# Patient Record
(demographics unavailable — no encounter records)

---

## 2022-01-01 LAB
ABO + RH BLDCO: ABNORMAL
DAT IGG-SP REAG RBC-IMP: NEGATIVE
GLUCOSE BLD-MCNC: 41 MG/DL (ref 65–110)
GLUCOSE BLD-MCNC: 44 MG/DL (ref 65–110)
GLUCOSE BLD-MCNC: 44 MG/DL (ref 65–110)
GLUCOSE BLD-MCNC: 45 MG/DL (ref 65–110)
GLUCOSE BLD-MCNC: 45 MG/DL (ref 65–110)
GLUCOSE BLD-MCNC: 46 MG/DL (ref 65–110)
GLUCOSE BLD-MCNC: 48 MG/DL (ref 65–110)
GLUCOSE BLD-MCNC: 49 MG/DL (ref 65–110)
GLUCOSE BLD-MCNC: 53 MG/DL (ref 65–110)
GLUCOSE BLD-MCNC: 54 MG/DL (ref 65–110)
GLUCOSE BLD-MCNC: 57 MG/DL (ref 65–110)
LAB DEVICE SERIAL NUMBER: NORMAL
SHIPPED DATE: NORMAL
WEAK D: ABNORMAL
WEAK D: NORMAL

## 2022-01-01 NOTE — PROCEDURES
Circumcision Procedure        Patient:   Catrachita Maldonado            MRN: [id number]            FIN: [id number]               Age:   55 hours     Sex:  Male     :  2022   Associated Diagnoses:   Encounter for  circumcision   Author:   Shayne PADILLA      Procedure   Circumcision Note       Date: 2022       Time: 9:55 am       Preprocedure diagnosis: parent requests elective circumcision       Post-procedure diagnosis: status post circumcision       Procedure description: Patient place in supine position with upper body wrapped in blanket and legs gently restrained on circumcision board. 1% lidocaine injected at 1 oclock and 11 oclock positions at base of penis (0.5 cc each side SQ). Curved hemostats placed at 3 oclock and 9 oclock position to elevate the foreskin. Straight hemostat inserted between foreskin and head of penis to release adhesions 360 degrees. Straight hemostat clamped on dorsal surface for crimping; left in place for 1 minute. Scissors used to make the dorsal slit after straight hemostat removed. Pulled foreskin back to expose the penile head and check for any remaining adhesions. Gentle retraction with gauze to pull back any remaining adhesions to expose the penile corona 360 degrees. Foreskin pulled back up over penile head. Gomco bell inserted to cover penile head and secured with safety pin; bell pulled through Gomco base and secured in Gomco arm; straight hemostats used to pull remaining foreskin up to the end of the dorsal slit. Gomco arm tightened down for crimping x 5 minutes. 10 blade scalpel used to trim foreskin at the bell/base junction. Gomco arm unscrewed and foreskin gently released from bell. Inspected area for any bleeding-none found; vaseline and gauze placed over penile head. Patient tolerated procedure well; oral sucrose given intermittently throughout procedure for additional pain control. Consent signed: Yes       Specimen obtained:  Yes List specimen removed: penile foreskin       Anesthesia: **Dorsal penile nerve block with 1% lidocaine (without epinephrine) preservative-free; 1ml vial at bedside for physician use  **24% sucrose in purified water at bedside-give 1-2 drops by mouth prn pain       Gomco size:x1.3        Estimated blood loss: none       Complications: none       Comments: Patient tolerated procedure well  Vaseline and gauze applied  Parents updated on post surgical care      Impression and Plan   Diagnosis     Encounter for  circumcision ([id number] Z41.2, Discharge, Medical).      Signature Line     Electronically Signed on 2022 11:59 AM EDT   ________________________________________________   Mable Lombard D-NP

## 2022-01-01 NOTE — H&P
[facility] Admission H&P        Patient:   Cathy Palmer            MRN: [id number]            FIN: [id number]               Age:   4 hours     Sex:  Male     :  2022   Associated Diagnoses:   Liveborn infant by  delivery; Large for gestational age infant   Author:   Jenny PADILLA      Basic Information   Admitted from:  Labor and delivery. Present at bedside:  Family member, Mother, Father. Infant name is Ynes Hinojosa       Review of Systems   Not applicable:  Patient is . Health Status   Allergies: Allergic Reactions (Selected)  No Known Allergies,    Allergies (1) Active Reaction  No Known Allergies None Documented     Current medications: None,  (Selected)   Inpatient Medications  Ordered  Desitin 40% topical ointment: 1 odell, Topical, q4hr, PRN: other (see comment)  Sweet-Ease Oral syrup range dose: 4 mL, Oral, q10min, PRN: other (see comment)  glucose oral gel  (): 2.5 mL, Buccal, On Call, PRN: blood glucose  lidocaine 1% preservative-free injectable solution: 2 mL, ID, On Call,    Medications (4) Active  Scheduled: (1)  lidocaine 1% PF Inj Soln 2 mL  2 mL, ID, On Call  Continuous: (0)  PRN: (3)  glucose 40%  Oral Gel 2 mL  2.5 mL, Buccal, On Call  sucrose PF solution 15 ml  4 mL, Oral, q10min  zinc oxide 40% Topical Oint 30 gm  1 odell, Topical, q4hr     Problem list:    No qualifying data available        Histories      Maternal History   General information   The mother is  29 years old. : 3. Para: 2, abortions  1, living  2.  female. Prenatal labs   Blood type: A, Rh negative, Rhogam received during pregnancy, antibody screen is negative. Rapid plasma reagin: nonreactive. Hepatitis B: negative. Human immunodeficiency virus: negative. Group B Strep: positive. Rubella: immune. Current pregnancy   At delivery. Medications received during pregnancy   PNV  Vit C.      Past history   Medical  Heart Birth Weight Ounces Conversion 8.6 oz    Head Circumference 38 cm    Head Circumference 38 cm    Head Circumference 38 cm    Birth Head Circumference 38 cm    Birth Head Circumference 38 cm    Gestational Assessment Large for gestational age         Vital Signs (last 25 hrs)_____  Last Charted___________  Temp Axillary     37.0 degC  (AUG 01 16:40)  Heart Rate Apical    116 bpm  (AUG 01 16:40)  Resp Rate         40 br/min  (AUG 01 16:40)  Weight      4.325 kg  (AUG 01 13:09)  Height      54.5 cm  (AUG 01 13:09)     General:  No acute distress, In open crib. Eye:  Normal conjunctiva, Deferred due to erythromycin eye ointment. HENT:  Normocephalic, Anterior fontanelle open/soft/flat, Ears normally set and rotated, Palate intact. Neck:  Supple, Clavicles intact. Respiratory:  Lungs are clear to auscultation, Respirations are non-labored, Breath sounds are equal, Symmetrical chest wall expansion. Cardiovascular:  Normal rate, Regular rhythm, No murmur, Good pulses equal in all extremities, Normal peripheral perfusion. Gastrointestinal:  Soft, Normal bowel sounds, Cord clamped and drying. Genitourinary:  Normal genitalia for age and sex, R testicle in canal.    Musculoskeletal     Normal range of motion. No deformity. No hip clicks. Normal White's. Upper extremity exam: Upper extremity exam is within normal limits. Spine/torso exam: spine/torso exam is within normal limits. Lower extremity exam: Lower extremity exam is within normal limits, No hip clicks. Integumentary:  Warm, Dry, Pink. Neurologic:  Alert, Hand grasp present, Grossly intact. Review / Management   Results review:     No qualifying data available, All Results   2022 15:38 EDT Glucose POC 54.0 mg/dL  LOW   2022 14:03 EDT CRDABORH Interp A POS    Cord AR Interp Negative   .        Health Maintenance   Medication Administered:  Medication administered Hepatitis B vaccine, Phytonadione, erythromycin 0.5% ophthalmic ointment applied in both eyes, and 2022. Care Practices/ Screens   Hearing screen: prior to discharge. CCHD and [place] Screen PTD. TCB/TSB at 24 hours and PRN. .        Impression and Plan   Diagnosis     Liveborn infant by  delivery (FQT50-TR Z38.01, Discharge, Medical). Large for gestational age infant (OCL07-BD P80.4, Discharge, Medical). Condition:  Stable, This is a term LGA infant, glucoses thus far are stable, see above. Mother is breastfeeding, he has voided, yet to stool. EOS is 0.04, infant is well appearing. .    Plan     Circumcision: prior to discharge. Plan:     1) Follow infant vital signs, I/O and  screens   2) Assist with breastfeeding prn   3) Anticipate discharge with mom   4) Follow up will be with [facility]        5) Follow glucoses for LGA   Education and Follow-up:          Counseled: Family, I discussed infant exam, plan of care and when to follow with PCP. Rsoas Mcclain Discharge Planning: Plan to discharge ( In  2-3  days ), [facility] Within 1 to 2 days after discharge. Signature Line     Electronically Signed on 2022 05:55 PM EDT   ________________________________________________   Gold PADILLA      Electronically Signed on 2022 01:32 AM EDT   ________________________________________________   Allan Stevenson MD, Sasha Gardner.       Electronically Signed on 2022 08:19 AM EDT   ________________________________________________   Gold PADILLA            Modified by: Gold PADILLA on 2022 05:55 PM EDT      Modified by: Gold PADILLA on 2022 08:19 AM EDT

## 2022-01-01 NOTE — PROGRESS NOTES
[facility] Daily Note        Patient:   Cathy Palmer            MRN: [id number]            FIN: [id number]               Age:   23 hours     Sex:  Male     :  2022   Associated Diagnoses:   Liveborn infant by  delivery; Large for gestational age infant; Hypoglycemia,    Author:   Jenny PADILLA      Basic Information   Admitted from:  Labor and delivery. Present at bedside:  Family member, Mother, Father. Infant name is Ynes Hinojosa       Review of Systems   Not applicable:  Patient is . Health Status   Allergies: Allergic Reactions (Selected)  No Known Allergies,    Allergies (1) Active Reaction  No Known Allergies None Documented     Current medications: None,  (Selected)   Inpatient Medications  Ordered  Desitin 40% topical ointment: 1 odell, Topical, q4hr, PRN: other (see comment)  Sweet-Ease Oral syrup range dose: 4 mL, Oral, q10min, PRN: other (see comment)  glucose oral gel  (): 2.5 mL, Buccal, On Call, PRN: blood glucose  lidocaine 1% preservative-free injectable solution: 2 mL, ID, On Call,    Medications (4) Active  Scheduled: (1)  lidocaine 1% PF Inj Soln 2 mL  2 mL, ID, On Call  Continuous: (0)  PRN: (3)  glucose 40%  Oral Gel 2 mL  2.5 mL, Buccal, On Call  sucrose PF solution 15 ml  4 mL, Oral, q10min  zinc oxide 40% Topical Oint 30 gm  1 odell, Topical, q4hr     Problem list:    No qualifying data available        Histories      Maternal History   General information   The mother is  29 years old. : 3. Para: 2, abortions  1, living  2.  female. Prenatal labs   Blood type: A, Rh negative, Rhogam received during pregnancy, antibody screen is negative. Rapid plasma reagin: nonreactive. Hepatitis B: negative. Human immunodeficiency virus: negative. Group B Strep: positive. Rubella: immune. Current pregnancy   At delivery. Medications received during pregnancy   PNV  Vit C.      Past history 2022 13:09 EDT Height/Length Measured 54.5 cm    Height/Length Measured 54.5 cm    Height/Length Measured 54.5 cm    Birth Length 54.5 cm    Birth Length 54.5 cm    Weight Measured 4.325 kg    Weight Measured 4.325 kg    Weight Measured 4.325 kg    Weight Dosing 4.325 kg    Birth Weight 4.325 kg    Birth Weight 4.325 kg    Birth Weight Pounds Conversion 9 lb    Birth Weight Ounces Conversion 8.6 oz    Head Circumference 38 cm    Head Circumference 38 cm    Head Circumference 38 cm    Birth Head Circumference 38 cm    Birth Head Circumference 38 cm    Gestational Assessment Large for gestational age         Vital Signs (last 25 hrs)_____  Last Charted___________  Temp Axillary     37.3 degC  (AUG 02 04:33)  Heart Rate Apical    120 bpm  (AUG 02 04:33)  Resp Rate         48 br/min  (AUG 02 04:33)  Weight      4.325 kg  (AUG 01 13:09)  Height      54.5 cm  (AUG 01 13:09)     General:  No acute distress, In open crib. Eye:  Normal conjunctiva. HENT:  Normocephalic, Anterior fontanelle open/soft/flat, Ears normally set and rotated, Palate intact. Neck:  Supple, Clavicles intact. Respiratory:  Lungs are clear to auscultation, Respirations are non-labored, Breath sounds are equal, Symmetrical chest wall expansion. Cardiovascular:  Normal rate, Regular rhythm, No murmur, Good pulses equal in all extremities, Normal peripheral perfusion. Gastrointestinal:  Soft, Non-tender, Non-distended, Normal bowel sounds. Genitourinary:  Normal genitalia for age and sex, R testicle in canal.    Musculoskeletal     Normal range of motion. No deformity. No hip clicks. Normal White's. Upper extremity exam: Upper extremity exam is within normal limits. Spine/torso exam: spine/torso exam is within normal limits. Lower extremity exam: Lower extremity exam is within normal limits, No hip clicks. Integumentary:  Warm, Dry, Pink.     Neurologic:  Alert, Moves all extremities appropriately, [place], rooting, sucking reflexes are normal, Hand grasp present, Grossly intact. Review / Management   Results review:     No qualifying data available, All Results   2022 7:46 EDT Glucose POC 49.0 mg/dL  LOW   2022 6:26 EDT Glucose POC 41.0 mg/dL  LOW   2022 6:24 EDT Glucose POC 44.0 mg/dL  LOW   2022 0:38 EDT Glucose POC 53.0 mg/dL  LOW   2022 21:37 EDT Glucose POC 46.0 mg/dL  LOW   2022 17:56 EDT Glucose POC 48.0 mg/dL  LOW   2022 17:55 EDT Glucose POC 44.0 mg/dL  LOW   2022 15:38 EDT Glucose POC 54.0 mg/dL  LOW   2022 14:03 EDT CRDABORH Interp A POS    Cord AR Interp Negative   . Health Maintenance   Medication Administered:  Medication administered Hepatitis B vaccine, Phytonadione, erythromycin 0.5% ophthalmic ointment applied in both eyes, and 2022. Care Practices/ Screens   Hearing screen: prior to discharge. CCHD and  State Screen PTD. TCB/TSB at 24 hours and PRN. .        Impression and Plan   Diagnosis     Liveborn infant by  delivery (RTM17-BY Z38.01, Discharge, Medical). Large for gestational age infant (MQI73-GM P80.4, Discharge, Medical). Hypoglycemia,  (ZTB31-AA P70.4, Discharge, Medical). Condition:  Stable, This is a term LGA infant. Infant with hypoglycemia ( see glucoses above). Glucose gel x 1. Infant is breastfeeding fair, voiding and stooling appropriately. EOS is 0.04, infant is well appearing. .    Plan     Circumcision: prior to discharge. Plan:     1) Follow infant vital signs, I/O and  screens   2) Assist with breastfeeding prn   3) Anticipate discharge with mom   4) Follow up will be with [facility]        5) Follow glucoses for LGA   Education and Follow-up:          Counseled: Family, I discussed infant exam, plan of care and when to follow with PCP. Olga Lidia Miller Discharge Planning: Plan to discharge ( In  2-3  days ), [facility] Within 1 to 2 days after discharge. Professional Services   Reviewed with Dr. Evangelista Cancer     Electronically Signed on 2022 08:50 AM EDT   ________________________________________________   Magno PADILLA               Modified by: Magno PADILLA on 2022 08:50 AM EDT

## 2022-01-01 NOTE — DISCHARGE SUMMARY
Discharge Summary        Patient:   Mone Thomas            MRN: [id number]            FIN: [id number]               Age:   55 hours     Sex:  Male     :  2022   Associated Diagnoses:   Liveborn infant by  delivery; Large for gestational age infant; Hypoglycemia,    Author:   Sebas PADILLA      Basic Information   Admitted from:  Labor and delivery. Present at bedside:  Family member, Mother, Father. Infant name is Rajesh Bee       Review of Systems   Not applicable:  Patient is . Health Status   Allergies: Allergic Reactions (Selected)  No Known Allergies,    Allergies (1) Active Reaction  No Known Allergies None Documented     Current medications: None,  (Selected)   Inpatient Medications  Ordered  Desitin 40% topical ointment: 1 odell, Topical, q4hr, PRN: other (see comment)  Sweet-Ease Oral syrup range dose: 4 mL, Oral, q10min, PRN: other (see comment)  glucose oral gel  (): 2.5 mL, Buccal, On Call, PRN: blood glucose,    Medications (3) Active  Scheduled: (0)  Continuous: (0)  PRN: (3)  glucose 40%  Oral Gel 2 mL  2.5 mL, Buccal, On Call  sucrose PF solution 15 ml  4 mL, Oral, q10min  zinc oxide 40% Topical Oint 30 gm  1 odell, Topical, q4hr     Problem list:    No qualifying data available        Histories      Maternal History   General information   The mother is  29 years old. : 3. Para: 2, abortions  1, living  2.  female. Prenatal labs   Blood type: A, Rh negative, Rhogam received during pregnancy, antibody screen is negative. Rapid plasma reagin: nonreactive. Hepatitis B: negative. Human immunodeficiency virus: negative. Group B Strep: positive. Rubella: immune. Current pregnancy   At delivery. Medications received during pregnancy   PNV  Vit C. Past history   Medical  Heart Murmur. Social history   Denies alcohol, tobacco and drug use.      [place] information     Delivery: birth date/ time  2022 13:09:00, male infant, single birth,  section delivery (scheduled, repeat, ROM at delivery), 3  vessel cord. Gestational Age by Dates: 41  weeks, 6  days. Growth parameters at birth: Weight (4,325  grams, 95%), Length (54.5  cms, 94%), Head Circumference (38  cms, 98%). Resuscitation: tactile stimulation, suctioned. APGAR score 1 minute: Total score 8 /10. APGAR score 5 minutes: Total score 9 /10. Since delivery infant has: voided 9  times, stooled 5  times, taken breast feeding well, taken formula feeding well. Family History:    No family history items have been selected or recorded.       Physical Examination   Vital Signs   2022 8:00 EDT Temperature Axillary 37.0 degC    Apical Heart Rate 136 bpm    Respiratory Rate 50 br/min   2022 4:00 EDT Temperature Axillary 37.2 degC    Apical Heart Rate 140 bpm    Respiratory Rate 50 br/min   2022 19:30 EDT Temperature Axillary 37.1 degC    Apical Heart Rate 140 bpm    Respiratory Rate 44 br/min   2022 16:00 EDT Temperature Axillary 37.1 degC    Apical Heart Rate 124 bpm    Respiratory Rate 60 br/min   2022 12:30 EDT Temperature Axillary 37.5 degC    Apical Heart Rate 120 bpm    Respiratory Rate 52 br/min   2022 7:45 EDT Temperature Axillary 37.3 degC    Apical Heart Rate 128 bpm    Respiratory Rate 56 br/min   2022 4:33 EDT Temperature Axillary 37.3 degC    Apical Heart Rate 120 bpm    Respiratory Rate 48 br/min   2022 0:45 EDT Temperature Axillary 36.8 degC    Apical Heart Rate 140 bpm    Respiratory Rate 60 br/min      Measurements from flowsheet : Measurements   2022 15:58 EDT Weight Measured 4.081 kg     Percent Weight Change 5.64 %   2022 8:19 EDT Head Circumference 38 cm         Vital Signs (last 24 hrs)_____  Last Charted___________  Temp Axillary     37.0 degC  (AUG 03 08:00)  Heart Rate Apical    136 bpm  (AUG 03 08:00)  Resp Rate         50 br/min  (AUG 03 08:00)  Weight      4.081 kg  (AUG 02 15:58)     General:  No acute distress, In open crib. Eye:  Pupils are equal, round and reactive to light, Normal conjunctiva. Red reflex: Bilaterally, Present. HENT:  Normocephalic, Anterior fontanelle open/soft/flat, Ears normally set and rotated, Palate intact. Neck:  Supple, Clavicles intact. Respiratory:  Lungs are clear to auscultation, Respirations are non-labored, Breath sounds are equal, Symmetrical chest wall expansion. Cardiovascular:  Normal rate, Regular rhythm, No murmur, Good pulses equal in all extremities, Normal peripheral perfusion. Gastrointestinal:  Soft, Non-tender, Non-distended, Normal bowel sounds, Anus patent. Genitourinary:  Normal genitalia for age and sex, R testicle in canal, Circ without active bleeding. Musculoskeletal     Normal range of motion. No deformity. No hip clicks. Normal White's. Upper extremity exam: Upper extremity exam is within normal limits. Spine/torso exam: spine/torso exam is within normal limits. Lower extremity exam: Lower extremity exam is within normal limits, No hip clicks. Integumentary:  Warm, Dry, Pink, No rash. Neurologic:  Alert, Normal motor function, Moves all extremities appropriately, [place], rooting, sucking reflexes are normal, Hand grasp present, Grossly intact. Review / Management   Results review:     No qualifying data available, All Results   2022 14:21 EDT Glucose POC 57.0 mg/dL  LOW   2022 11:44 EDT Glucose POC 45.0 mg/dL  LOW   2022 9:38 EDT Glucose POC 45.0 mg/dL  LOW   2022 7:46 EDT Glucose POC 49.0 mg/dL  LOW   2022 6:26 EDT Glucose POC 41.0 mg/dL  LOW   2022 6:24 EDT Glucose POC 44.0 mg/dL  LOW   2022 0:38 EDT Glucose POC 53.0 mg/dL  LOW   .        Health Maintenance   Medication Administered:  Medication administered Hepatitis B vaccine, Phytonadione, erythromycin 0.5% ophthalmic ointment applied in both